# Patient Record
Sex: FEMALE | Race: BLACK OR AFRICAN AMERICAN | URBAN - METROPOLITAN AREA
[De-identification: names, ages, dates, MRNs, and addresses within clinical notes are randomized per-mention and may not be internally consistent; named-entity substitution may affect disease eponyms.]

---

## 2021-11-09 ENCOUNTER — LAB OUTSIDE AN ENCOUNTER (OUTPATIENT)
Dept: URBAN - METROPOLITAN AREA CLINIC 109 | Facility: CLINIC | Age: 51
End: 2021-11-09

## 2021-11-09 ENCOUNTER — OFFICE VISIT (OUTPATIENT)
Dept: URBAN - METROPOLITAN AREA CLINIC 109 | Facility: CLINIC | Age: 51
End: 2021-11-09

## 2021-11-09 ENCOUNTER — WEB ENCOUNTER (OUTPATIENT)
Dept: URBAN - METROPOLITAN AREA CLINIC 109 | Facility: CLINIC | Age: 51
End: 2021-11-09

## 2021-11-09 ENCOUNTER — OFFICE VISIT (OUTPATIENT)
Dept: URBAN - METROPOLITAN AREA CLINIC 109 | Facility: CLINIC | Age: 51
End: 2021-11-09
Payer: COMMERCIAL

## 2021-11-09 ENCOUNTER — DASHBOARD ENCOUNTERS (OUTPATIENT)
Age: 51
End: 2021-11-09

## 2021-11-09 DIAGNOSIS — R07.9 CHEST PAIN, UNSPECIFIED TYPE: ICD-10-CM

## 2021-11-09 DIAGNOSIS — Z12.11 COLON CANCER SCREENING: ICD-10-CM

## 2021-11-09 PROBLEM — 38341003 HYPERTENSION: Status: ACTIVE | Noted: 2021-11-09

## 2021-11-09 PROCEDURE — 99203 OFFICE O/P NEW LOW 30 MIN: CPT | Performed by: INTERNAL MEDICINE

## 2021-11-09 NOTE — HPI-TODAY'S VISIT:
The patient has been referred for abdominal pain. Most of her discomfort is in the right anterior chest area and under the right breast. Pain can be severe and she went to the ER on 9/1/21.  She had normal CBC, CMP.  She had an abdominal CT scan which was negative. She has had pain for at least 3 years intermittently, but on most days.  She had a CXR also which she reports was normal. She has relief with having a BM.  She has a BM 1-2x daily and does not have constipation, but has ocassional diarrhea.  She feels pressure in the chest after a meal. She drinks 3-4 days a weeks, liquor, 3-4 mixed drinks.  Drinks a bottle of wine sometimes by herself. Usually smokes marijuana 3-4 times a week and 2-3 small cigars daily. Her last mammogram was 3 years ago in [place]. No prior colonoscopy. FH negative for colon cancer.  PMH - HTN.

## 2021-12-07 ENCOUNTER — TELEPHONE ENCOUNTER (OUTPATIENT)
Dept: URBAN - METROPOLITAN AREA CLINIC 94 | Facility: CLINIC | Age: 51
End: 2021-12-07

## 2021-12-07 RX ORDER — AMLODIPINE BESYLATE 10 MG/1
1 TABLET TABLET ORAL ONCE A DAY
Qty: 30 | OUTPATIENT
Start: 2021-12-08

## 2021-12-07 RX ORDER — HYDROCHLOROTHIAZIDE 25 MG/1
1 TABLET IN THE MORNING TABLET ORAL ONCE A DAY
Qty: 30 | OUTPATIENT
Start: 2021-12-08

## 2021-12-10 ENCOUNTER — OFFICE VISIT (OUTPATIENT)
Dept: URBAN - METROPOLITAN AREA SURGERY CENTER 23 | Facility: SURGERY CENTER | Age: 51
End: 2021-12-10
Payer: COMMERCIAL

## 2021-12-10 DIAGNOSIS — Z12.11 AVERAGE RISK FOR CRC. DUE TO PT'S CO-MORBID STATE WITH END STAGE DEMENTIA, HIGH RISK FOR ANESTHESIA (PER NEUROLOGY); INABILITY TO TAKE A BOWEL PREP....WOULD NOT ADVISE ANY COLORECTAL CANCER SCREENING INCLUDING STOOL TEST FOR FECAL BLOOD.: ICD-10-CM

## 2021-12-10 PROCEDURE — G8907 PT DOC NO EVENTS ON DISCHARG: HCPCS | Performed by: INTERNAL MEDICINE

## 2021-12-10 PROCEDURE — 45378 DIAGNOSTIC COLONOSCOPY: CPT | Performed by: INTERNAL MEDICINE

## 2021-12-10 RX ORDER — HYDROCHLOROTHIAZIDE 25 MG/1
1 TABLET IN THE MORNING TABLET ORAL ONCE A DAY
Qty: 30 | Status: ACTIVE | COMMUNITY
Start: 2021-12-08

## 2021-12-10 RX ORDER — AMLODIPINE BESYLATE 10 MG/1
1 TABLET TABLET ORAL ONCE A DAY
Qty: 30 | Status: ACTIVE | COMMUNITY
Start: 2021-12-08